# Patient Record
Sex: FEMALE | Race: WHITE | NOT HISPANIC OR LATINO | URBAN - METROPOLITAN AREA
[De-identification: names, ages, dates, MRNs, and addresses within clinical notes are randomized per-mention and may not be internally consistent; named-entity substitution may affect disease eponyms.]

---

## 2024-08-06 ENCOUNTER — OFFICE VISIT (OUTPATIENT)
Dept: GASTROENTEROLOGY | Facility: CLINIC | Age: 59
End: 2024-08-06
Payer: COMMERCIAL

## 2024-08-06 ENCOUNTER — TELEPHONE (OUTPATIENT)
Dept: GASTROENTEROLOGY | Facility: CLINIC | Age: 59
End: 2024-08-06

## 2024-08-06 VITALS
BODY MASS INDEX: 24.41 KG/M2 | DIASTOLIC BLOOD PRESSURE: 82 MMHG | WEIGHT: 143 LBS | HEIGHT: 64 IN | SYSTOLIC BLOOD PRESSURE: 130 MMHG

## 2024-08-06 DIAGNOSIS — K57.32 DIVERTICULITIS LARGE INTESTINE W/O PERFORATION OR ABSCESS W/O BLEEDING: Primary | ICD-10-CM

## 2024-08-06 PROCEDURE — 99204 OFFICE O/P NEW MOD 45 MIN: CPT | Performed by: INTERNAL MEDICINE

## 2024-08-06 RX ORDER — ROSUVASTATIN CALCIUM 10 MG/1
10 TABLET, COATED ORAL DAILY
COMMUNITY

## 2024-08-06 RX ORDER — METRONIDAZOLE 500 MG/1
500 TABLET ORAL EVERY 8 HOURS SCHEDULED
COMMUNITY
End: 2024-08-06 | Stop reason: SDUPTHER

## 2024-08-06 RX ORDER — CEPHALEXIN 500 MG/1
500 CAPSULE ORAL EVERY 6 HOURS SCHEDULED
COMMUNITY
End: 2024-08-06 | Stop reason: SDUPTHER

## 2024-08-06 RX ORDER — CEPHALEXIN 500 MG/1
500 CAPSULE ORAL EVERY 6 HOURS SCHEDULED
Qty: 28 CAPSULE | Refills: 0 | Status: SHIPPED | OUTPATIENT
Start: 2024-08-06 | End: 2024-08-13

## 2024-08-06 RX ORDER — METRONIDAZOLE 500 MG/1
500 TABLET ORAL EVERY 8 HOURS SCHEDULED
Qty: 21 TABLET | Refills: 0 | Status: SHIPPED | OUTPATIENT
Start: 2024-08-06 | End: 2024-08-13

## 2024-08-06 NOTE — TELEPHONE ENCOUNTER
Scheduled date of colonoscopy (as of today): 9-  Physician performing colonoscopy: ann frazier  Location of colonoscopy:  [facility]  Bowel prep reviewed with patient:  clenpiq   Instructions reviewed with patient by:  jesus  Clearances: na

## 2024-08-06 NOTE — PROGRESS NOTES
[facility] - Outpatient Consultation  Demetra Bonds 58 y.o. female MRN: [id number]  Encounter: [id number]    ASSESSMENT AND PLAN:      1. Diverticulitis large intestine w/o perforation or abscess w/o bleeding  She has been told that she has pandiverticulosis, however all of her episodes have been on the left side.  I will proceed with colonoscopy to assess the extent of diverticulosis and to assess healing.  Procedure risk and preparation discussed in detail.  I have also extended her course of antibiotic for another 7 days due to her continued tenderness  - Colonoscopy; Future  - sodium picosulfate, magnesium oxide, citric acid (Clenpiq) oral solution; Take 175 mL (1 bottle) the evening before the colonoscopy, between 5 PM and 9 PM, followed by a second 175 mL bottle 5 hours before the colonoscopy.  Dispense: 350 mL; Refill: 0  - cephalexin (KEFLEX) 500 mg capsule; Take 1 capsule (500 mg total) by mouth every 6 (six) hours for 7 days  Dispense: 28 capsule; Refill: 0  - metroNIDAZOLE (FLAGYL) 500 mg tablet; Take 1 tablet (500 mg total) by mouth every 8 (eight) hours for 7 days  Dispense: 21 tablet; Refill: 0      Follow up Appointment: For colonoscopy in 4 to 6 weeks    _______________________      Chief Complaint   Patient presents with    Diverticulitis 3 times in the past month     Referred by Dr. Che       HPI:   Deemtra Bonds is a 58 y.o. year old female with a PMH significant for recurrent left-sided diverticulitis who presents from a consultation from PCP for diverticulitis.  She states that she has been diagnosed 3 times with diverticulitis in the last 6 weeks or so.  She has had multiple episodes in the past, 1 even requiring admission in [place] for.  Despite all of the episodes, she has been reluctant to see a surgeon.  She had a colonoscopy 5 years ago that showed left-sided diverticulosis as well as some in the transverse, nothing on the right side.  She is  "currently taking Keflex and Flagyl for another episode.  She most recently had a CAT scan last fall confirming descending diverticulitis without complication.    Historical Information   Past Medical History:   Diagnosis Date    Cancer (HCC) 2015    Right kidney    Diverticulitis of colon 2018     Past Surgical History:   Procedure Laterality Date    COLONOSCOPY  2019    Done at [facility] in [place]     Social History     Substance and Sexual Activity   Alcohol Use Never     Social History     Substance and Sexual Activity   Drug Use Never     Social History     Tobacco Use   Smoking Status Former    Current packs/day: 0.00    Average packs/day: 1 pack/day for 5.0 years (5.0 ttl pk-yrs)    Types: Cigarettes    Start date: 1985    Quit date: 1990    Years since quittin.6   Smokeless Tobacco Never     Family History   Problem Relation Age of Onset    Colon polyps Neg Hx     Colon cancer Neg Hx        Meds/Allergies     Current Outpatient Medications:     cephalexin (KEFLEX) 500 mg capsule    Cholecalciferol (VITAMIN D3) 1,000 units tablet    metroNIDAZOLE (FLAGYL) 500 mg tablet    rosuvastatin (CRESTOR) 10 MG tablet    sodium picosulfate, magnesium oxide, citric acid (Clenpiq) oral solution    Allergies   Allergen Reactions    Levaquin [Levofloxacin] Other (See Comments)     Stiff joints       PHYSICAL EXAM:    Blood pressure 130/82, height 5' 4\" (1.626 m), weight 64.9 kg (143 lb). Body mass index is 24.55 kg/m².  General Appearance: NAD, cooperative, alert  Eyes: Anicteric  GI:  Soft, non-tender, non-distended; normal bowel sounds; no masses, no organomegaly   Rectal: Deferred  Musculoskeletal: No edema.  Skin:  No jaundice    Lab Results:   No results found for: \"WBC\", \"HGB\", \"MCV\", \"PLT\", \"INR\"  No results found for: \"NA\", \"K\", \"CL\", \"CO2\", \"ANIONGAP\", \"BUN\", \"CREATININE\", \"GLUCOSE\", \"GLUF\", \"CALCIUM\", \"CORRECTEDCA\", \"AST\", \"ALT\", \"ALKPHOS\", \"PROT\", \"BILITOT\", \"EGFR\"  No results found for: " "\"IRON\", \"TIBC\", \"FERRITIN\"  No results found for: \"LIPASE\"    Radiology Results:   No results found.  "

## 2024-08-29 ENCOUNTER — ANESTHESIA EVENT (OUTPATIENT)
Dept: ANESTHESIOLOGY | Facility: AMBULATORY SURGERY CENTER | Age: 59
End: 2024-08-29

## 2024-08-29 ENCOUNTER — ANESTHESIA (OUTPATIENT)
Dept: ANESTHESIOLOGY | Facility: AMBULATORY SURGERY CENTER | Age: 59
End: 2024-08-29

## 2024-09-10 ENCOUNTER — TELEPHONE (OUTPATIENT)
Dept: GASTROENTEROLOGY | Facility: AMBULATORY SURGERY CENTER | Age: 59
End: 2024-09-10

## 2024-09-10 ENCOUNTER — PATIENT MESSAGE (OUTPATIENT)
Dept: GASTROENTEROLOGY | Facility: AMBULATORY SURGERY CENTER | Age: 59
End: 2024-09-10

## 2024-09-10 NOTE — PROGRESS NOTES
Attempt to call number listed to review instructions and confirm 7:30 Am arrival time for 9/12 colonoscopy, reached message that number is not in service.My chart message sent And left message on husbands's phone to please call us back.

## 2024-09-11 NOTE — TELEPHONE ENCOUNTER
Scheduled date of colonoscopy (as of today): 10/30/24  Physician performing colonoscopy: Dr. Zapata  Location of colonoscopy: [facility]  Bowel prep reviewed with patient: N/A  Instructions reviewed with patient by:N/A  Clearances: N/A    Pt has rescheduled her colonoscopy for Oct but still getting the auto reminders.  
2 = A lot of assistance

## 2024-10-16 ENCOUNTER — ANESTHESIA EVENT (OUTPATIENT)
Dept: ANESTHESIOLOGY | Facility: AMBULATORY SURGERY CENTER | Age: 59
End: 2024-10-16

## 2024-10-16 ENCOUNTER — ANESTHESIA (OUTPATIENT)
Dept: ANESTHESIOLOGY | Facility: AMBULATORY SURGERY CENTER | Age: 59
End: 2024-10-16

## 2024-10-30 ENCOUNTER — ANESTHESIA (OUTPATIENT)
Dept: GASTROENTEROLOGY | Facility: AMBULATORY SURGERY CENTER | Age: 59
End: 2024-10-30

## 2024-10-30 ENCOUNTER — ANESTHESIA EVENT (OUTPATIENT)
Dept: GASTROENTEROLOGY | Facility: AMBULATORY SURGERY CENTER | Age: 59
End: 2024-10-30

## 2024-10-30 ENCOUNTER — HOSPITAL ENCOUNTER (OUTPATIENT)
Dept: GASTROENTEROLOGY | Facility: AMBULATORY SURGERY CENTER | Age: 59
Discharge: HOME | End: 2024-10-30
Attending: INTERNAL MEDICINE
Payer: COMMERCIAL

## 2024-10-30 VITALS
BODY MASS INDEX: 24.07 KG/M2 | SYSTOLIC BLOOD PRESSURE: 116 MMHG | WEIGHT: 141 LBS | HEART RATE: 88 BPM | RESPIRATION RATE: 22 BRPM | HEIGHT: 64 IN | TEMPERATURE: 97.2 F | DIASTOLIC BLOOD PRESSURE: 57 MMHG | OXYGEN SATURATION: 100 %

## 2024-10-30 DIAGNOSIS — K57.32 DIVERTICULITIS LARGE INTESTINE W/O PERFORATION OR ABSCESS W/O BLEEDING: ICD-10-CM

## 2024-10-30 PROCEDURE — 45378 DIAGNOSTIC COLONOSCOPY: CPT | Performed by: INTERNAL MEDICINE

## 2024-10-30 RX ORDER — SODIUM CHLORIDE, SODIUM LACTATE, POTASSIUM CHLORIDE, CALCIUM CHLORIDE 600; 310; 30; 20 MG/100ML; MG/100ML; MG/100ML; MG/100ML
INJECTION, SOLUTION INTRAVENOUS CONTINUOUS PRN
Status: DISCONTINUED | OUTPATIENT
Start: 2024-10-30 | End: 2024-10-30

## 2024-10-30 RX ORDER — SODIUM CHLORIDE, SODIUM LACTATE, POTASSIUM CHLORIDE, CALCIUM CHLORIDE 600; 310; 30; 20 MG/100ML; MG/100ML; MG/100ML; MG/100ML
75 INJECTION, SOLUTION INTRAVENOUS CONTINUOUS
Status: DISCONTINUED | OUTPATIENT
Start: 2024-10-30 | End: 2024-11-03 | Stop reason: HOSPADM

## 2024-10-30 RX ORDER — PROPOFOL 10 MG/ML
INJECTION, EMULSION INTRAVENOUS AS NEEDED
Status: DISCONTINUED | OUTPATIENT
Start: 2024-10-30 | End: 2024-10-30

## 2024-10-30 RX ADMIN — PROPOFOL 50 MG: 10 INJECTION, EMULSION INTRAVENOUS at 08:28

## 2024-10-30 RX ADMIN — PROPOFOL 150 MG: 10 INJECTION, EMULSION INTRAVENOUS at 08:23

## 2024-10-30 RX ADMIN — PROPOFOL 50 MG: 10 INJECTION, EMULSION INTRAVENOUS at 08:33

## 2024-10-30 RX ADMIN — SODIUM CHLORIDE, SODIUM LACTATE, POTASSIUM CHLORIDE, CALCIUM CHLORIDE: 600; 310; 30; 20 INJECTION, SOLUTION INTRAVENOUS at 08:15

## 2024-10-30 RX ADMIN — SODIUM CHLORIDE, SODIUM LACTATE, POTASSIUM CHLORIDE, CALCIUM CHLORIDE 75 ML/HR: 600; 310; 30; 20 INJECTION, SOLUTION INTRAVENOUS at 08:14

## 2024-10-30 NOTE — H&P
"History and Physical -  [facility]  Demetra Bonds 58 y.o. female MRN: [id number]    HPI: Demetra Bonds is a 58 y.o. female who presents for colonoscopy due to recurrent diverticulitis of the left colon    REVIEW OF SYSTEMS: Per the HPI, and otherwise unremarkable.    Historical Information   Past Medical History:   Diagnosis Date    Cancer (HCC) 2015    Right kidney    Diverticulitis of colon 2018     Past Surgical History:   Procedure Laterality Date    BREAST SURGERY Bilateral 2009    reduction    COLONOSCOPY  2019    Done at [facility] in [place]    FOOT SURGERY Right 2010    Bunionectomy    KIDNEY SURGERY Right 2015    removal od cancer     Social History   Social History     Substance and Sexual Activity   Alcohol Use Never     Social History     Substance and Sexual Activity   Drug Use Never     Social History     Tobacco Use   Smoking Status Former    Current packs/day: 0.00    Average packs/day: 1 pack/day for 5.0 years (5.0 ttl pk-yrs)    Types: Cigarettes    Start date: 1985    Quit date: 1990    Years since quittin.8   Smokeless Tobacco Never     Family History   Problem Relation Age of Onset    Colon polyps Neg Hx     Colon cancer Neg Hx        Meds/Allergies       Current Outpatient Medications:     Cholecalciferol (VITAMIN D3) 1,000 units tablet    rosuvastatin (CRESTOR) 10 MG tablet    sodium picosulfate, magnesium oxide, citric acid (Clenpiq) oral solution    Current Facility-Administered Medications:     lactated ringers infusion, 75 mL/hr, Intravenous, Continuous    Allergies   Allergen Reactions    Levaquin [Levofloxacin] Other (See Comments)     Stiff joints       Objective     /76   Pulse 80   Temp (!) 97.2 °F (36.2 °C) (Temporal)   Resp 18   Ht 5' 4\" (1.626 m)   Wt 64 kg (141 lb)   SpO2 97%   BMI 24.20 kg/m²     PHYSICAL EXAM    General Appearance: NAD, cooperative, alert  Eyes: Anicteric  GI:  Soft, non-tender, non-distended; normal " bowel sounds; no masses, no organomegaly   Rectal: Deferred until procedure  Musculoskeletal: No edema.  Skin:  No jaundice    ASSESSMENT/PLAN:  This is a 58 y.o. female here for colonoscopy, and she is stable and optimized for her procedure.

## 2024-10-30 NOTE — ANESTHESIA POSTPROCEDURE EVALUATION
H&P reviewed. The patient was examined and there are no changes to the H&P.   Post-Op Assessment Note    CV Status:  Stable  Pain Score: 0    Pain management: adequate       Mental Status:  Alert and awake   Hydration Status:  Euvolemic   PONV Controlled:  Controlled   Airway Patency:  Patent     Post Op Vitals Reviewed: Yes    No anethesia notable event occurred.    Staff: Anesthesiologist           Last Filed PACU Vitals:  Vitals Value Taken Time   Temp     Pulse 88 10/30/24 0853   /57 10/30/24 0853   Resp 22 10/30/24 0853   SpO2 100 % 10/30/24 0853       Modified Patt:  Activity: 2 (10/30/2024  8:53 AM)  Respiration: 2 (10/30/2024  8:53 AM)  Circulation: 2 (10/30/2024  8:53 AM)  Consciousness: 2 (10/30/2024  8:53 AM)  Oxygen Saturation: 2 (10/30/2024  8:53 AM)  Modified Patt Score: 10 (10/30/2024  8:53 AM)

## 2024-10-30 NOTE — ANESTHESIA POSTPROCEDURE EVALUATION
Post-Op Assessment Note    CV Status:  Stable  Pain Score: 0    Pain management: adequate       Mental Status:  Awake and alert   Hydration Status:  Stable   PONV Controlled:  Controlled   Airway Patency:  Patent     Post Op Vitals Reviewed: Yes    No anethesia notable event occurred.    Staff: CRNA           Last Filed PACU Vitals:  Vitals Value Taken Time   Temp     Pulse 96    /61    Resp   20    SpO2   97        Modified Patt:  Activity: 2 (10/30/2024  8:01 AM)  Respiration: 2 (10/30/2024  8:01 AM)  Circulation: 2 (10/30/2024  8:01 AM)  Consciousness: 2 (10/30/2024  8:01 AM)  Oxygen Saturation: 2 (10/30/2024  8:01 AM)  Modified Patt Score: 10 (10/30/2024  8:01 AM)